# Patient Record
Sex: FEMALE | Race: WHITE
[De-identification: names, ages, dates, MRNs, and addresses within clinical notes are randomized per-mention and may not be internally consistent; named-entity substitution may affect disease eponyms.]

---

## 2018-04-10 ENCOUNTER — HOSPITAL ENCOUNTER (EMERGENCY)
Dept: HOSPITAL 17 - PHED | Age: 78
LOS: 1 days | Discharge: HOME | End: 2018-04-11
Payer: SELF-PAY

## 2018-04-10 VITALS — BODY MASS INDEX: 34.49 KG/M2 | HEIGHT: 63 IN | WEIGHT: 194.67 LBS

## 2018-04-10 VITALS
HEART RATE: 109 BPM | SYSTOLIC BLOOD PRESSURE: 167 MMHG | RESPIRATION RATE: 18 BRPM | DIASTOLIC BLOOD PRESSURE: 76 MMHG | OXYGEN SATURATION: 96 %

## 2018-04-10 VITALS
DIASTOLIC BLOOD PRESSURE: 95 MMHG | TEMPERATURE: 98 F | RESPIRATION RATE: 18 BRPM | OXYGEN SATURATION: 97 % | HEART RATE: 116 BPM | SYSTOLIC BLOOD PRESSURE: 196 MMHG

## 2018-04-10 VITALS
SYSTOLIC BLOOD PRESSURE: 133 MMHG | DIASTOLIC BLOOD PRESSURE: 72 MMHG | OXYGEN SATURATION: 96 % | RESPIRATION RATE: 18 BRPM | HEART RATE: 73 BPM

## 2018-04-10 DIAGNOSIS — I10: Primary | ICD-10-CM

## 2018-04-10 DIAGNOSIS — Z88.0: ICD-10-CM

## 2018-04-10 DIAGNOSIS — R00.0: ICD-10-CM

## 2018-04-10 DIAGNOSIS — F41.9: ICD-10-CM

## 2018-04-10 DIAGNOSIS — E11.9: ICD-10-CM

## 2018-04-10 DIAGNOSIS — Z91.14: ICD-10-CM

## 2018-04-10 DIAGNOSIS — E78.00: ICD-10-CM

## 2018-04-10 PROCEDURE — 99282 EMERGENCY DEPT VISIT SF MDM: CPT

## 2018-04-10 NOTE — PD
HPI


Chief Complaint:  Hypertension


Time Seen by Provider:  23:06


Travel History


International Travel<30 days:  No


Contact w/Intl Traveler<30days:  No


Traveled to known affect area:  No





History of Present Illness


HPI


The patient is a 77-year-old female with a history of anxiety and hypertension 

who noted her blood pressure was high at home.  She got 174/121 at home.  She 

was anxious.  She did not take her metoprolol.  She noticed she had a fast 

heart rate as well.  She denies any chest pain or shortness of breath.  She 

denies any headache or focal neurologic change.





PFSH


Past Medical History


Hx Anticoagulant Therapy:  No


Anxiety:  Yes


Cancer:  Yes (colon)


Cardiovascular Problems:  Yes (htn on meds)


High Cholesterol:  Yes


Diabetes:  Yes


Patient Takes Glucophage:  Yes (04/10/18 6pm)


Diminished Hearing:  No


Endocrine:  Yes


Gastrointestinal Disorders:  Yes (gallbladder removed)


Genitourinary:  No


Hypertension:  Yes


Immune Disorder:  No


Musculoskeletal:  No


Neurologic:  No


Reproductive:  No


Respiratory:  No


Immunizations Current:  Yes


Thyroid Disease:  Yes


Influenza Vaccination:  No


Pregnant?:  Not Pregnant


:  4


Para:  3


Miscarriage:  1


Ovarian Cysts:  Yes (HAD CYST REMOVED FROM RIGHT OVARY SEVERAL YEARS AGO)





Past Surgical History


Abdominal Surgery:  Yes (colon resection)


AICD:  No


Appendectomy:  Yes


 Section:  Yes


Gynecologic Surgery:  Yes (c section)


Hysterectomy:  Yes (ovaries removed only)


Joint Replacement:  No


Pacemaker:  No


Tonsillectomy:  Yes


Other Surgery:  Yes (right chest port chemo )





Social History


Alcohol Use:  No


Tobacco Use:  No (former)


Substance Use:  No





Allergies-Medications


(Allergen,Severity, Reaction):  


Coded Allergies:  


     penicillin G (Unverified  Allergy, Severe, RASH, 4/10/18)


Reported Meds & Prescriptions





Reported Meds & Active Scripts


Active


Reported


Levothyroxine (Levothyroxine Sodium) 50 Mcg Tab 50 Mcg PO DAILY


Metformin (Metformin HCl) 500 Mg Tab 500 Mg PO BIDPC


Amlodipine (Amlodipine Besylate) 10 Mg Tab 10 Mg PO DAILY


Losartan-Hydrochlorothiazide 100-12.5 Mg Tab 1 Tab PO DAILY


Metoprolol Succinate ER 24 HR (Metoprolol Succinate) 100 Mg Tab 100 Mg PO DAILY


Xanax (Alprazolam) 0.25 Mg Tab 0.25 Mg PO DAILY PRN








Review of Systems


Except as stated in HPI:  all other systems reviewed are Neg





Physical Exam


Narrative


GENERAL: The patient is alert, slightly anxious, oriented 3 no apparent 

distress.  Her blood pressure initially was 196/95 and repeat is 167 over 76.  

The heart rate is in the mid 90s.


SKIN: Focused skin assessment warm/dry.


HEAD: Atraumatic. Normocephalic. 


EYES: Pupils equal and round. No scleral icterus. No injection or drainage. 


ENT: No nasal bleeding or discharge.  Mucous membranes pink and moist.


NECK: Trachea midline. No JVD. 


CARDIOVASCULAR: Regular rate and rhythm.  No murmur appreciated.


RESPIRATORY: No accessory muscle use. Clear to auscultation. Breath sounds 

equal bilaterally. 


GASTROINTESTINAL: Abdomen soft, non-tender, nondistended. Hepatic and splenic 

margins not palpable. 


MUSCULOSKELETAL: No obvious deformities. No clubbing.  No cyanosis.  No edema. 


NEUROLOGICAL: Awake and alert. No obvious cranial nerve deficits.  Motor 

grossly within normal limits. Normal speech.


PSYCHIATRIC: Appropriate mood and affect; insight and judgment normal.





Data


Data


Last Documented VS





Vital Signs








  Date Time  Temp Pulse Resp B/P (MAP) Pulse Ox O2 Delivery O2 Flow Rate FiO2


 


4/10/18 22:58  109 18 167/76 (106) 96 Room Air  


 


4/10/18 20:48 98.0       











MDM


Medical Decision Making


Medical Screen Exam Complete:  Yes


Emergency Medical Condition:  Yes


Medical Record Reviewed:  Yes


Differential Diagnosis


Hypertension poor control, noncompliance, anxiety related blood pressure 

elevation


Narrative Course


The patient appears to have both noncompliance with respect to metoprolol and 

anxiety related blood pressure elevation.  When she calmed down the blood 

pressure came down to an acceptable level for the emergency department.  She is 

to follow-up with her primary care physician.





Diagnosis





 Primary Impression:  


 Elevated blood pressure reading


 Additional Impressions:  


 Anxiety


 Noncompliance with medications





***Additional Instructions:  


Make sure you take all of your blood pressure medicines correctly.  Follow-up 

with your primary care physician.  Tonight your blood pressure was likely 

elevated because of the anxiety and not taking metoprolol.


***Med/Other Pt SpecificInfo:  No Change to Meds


Disposition:  01 DISCHARGE HOME


Condition:  Stable











Aryan Senior MD Apr 10, 2018 23:11